# Patient Record
Sex: FEMALE | Race: WHITE | NOT HISPANIC OR LATINO | URBAN - METROPOLITAN AREA
[De-identification: names, ages, dates, MRNs, and addresses within clinical notes are randomized per-mention and may not be internally consistent; named-entity substitution may affect disease eponyms.]

---

## 2021-12-17 LAB
EXT 24 HR UR METANEPHRINE: NORMAL
EXT 24 HR UR NORMETANEPHRINE: NORMAL
EXT 24 HR UR NORMETANEPHRINE: NORMAL
EXT 25 HYDROXY VIT D2: NORMAL
EXT 25 HYDROXY VIT D3: NORMAL
EXT 5 HIAA 24 HR URINE: NORMAL
EXT 5 HIAA BLOOD: NORMAL
EXT ACTH: NORMAL
EXT AFP: NORMAL
EXT ALBUMIN: NORMAL
EXT ALKALINE PHOSPHATASE: NORMAL
EXT ALT: NORMAL
EXT AMYLASE: NORMAL
EXT ANTI ISLET CELL AB: NORMAL
EXT ANTI PARIETAL CELL AB: NORMAL
EXT ANTI THYROID AB: NORMAL
EXT AST: NORMAL
EXT BILIRUBIN DIRECT: NORMAL
EXT BILIRUBIN TOTAL: NORMAL
EXT BK VIRUS DNA QN PCR: NORMAL
EXT BUN: NORMAL
EXT C PEPTIDE: NORMAL
EXT CA 125: NORMAL
EXT CA 19-9: NORMAL
EXT CA 27-29: NORMAL
EXT CALCITONIN: NORMAL
EXT CALCIUM: 9.5 MG/DL
EXT CEA: NORMAL
EXT CHLORIDE: 99 MEQ/L
EXT CHOLESTEROL: NORMAL
EXT CHROMOGRANIN A: NORMAL
EXT CO2: 24 MEQ/L
EXT CREATININE UA: NORMAL
EXT CREATININE: 0.7 MG/DL
EXT CYCLOSPORONE LEVEL: NORMAL
EXT DOPAMINE: NORMAL
EXT EBV DNA BY PCR: NORMAL
EXT EPINEPHRINE: NORMAL
EXT FOLATE: NORMAL
EXT FREE T3: NORMAL
EXT FREE T4: NORMAL
EXT FSH: NORMAL
EXT GASTRIN RELEASING PEPTIDE: NORMAL
EXT GASTRIN RELEASING PEPTIDE: NORMAL
EXT GASTRIN: NORMAL
EXT GGT: NORMAL
EXT GHRELIN: NORMAL
EXT GLUCAGON: NORMAL
EXT GLUCOSE: 91 MG/DL
EXT GROWTH HORMONE: NORMAL
EXT HCV RNA QUANT PCR: NORMAL
EXT HDL: NORMAL
EXT HEMATOCRIT: 40.2 %
EXT HEMOGLOBIN A1C: NORMAL
EXT HEMOGLOBIN: 13.7 GRAM/DL
EXT HISTAMINE 24 HR URINE: NORMAL
EXT HISTAMINE: NORMAL
EXT IGF-1: NORMAL
EXT IMMUNKNOW (NON-STIMULATED): NORMAL
EXT IMMUNKNOW (STIMULATED): NORMAL
EXT INR: NORMAL
EXT INSULIN: NORMAL
EXT LANREOTIDE LEVEL: NORMAL
EXT LDH, TOTAL: NORMAL
EXT LDL CHOLESTEROL: NORMAL
EXT LIPASE: NORMAL
EXT MAGNESIUM: NORMAL
EXT METANEPHRINE FREE PLASMA: NORMAL
EXT MOTILIN: NORMAL
EXT NEUROKININ A CAMB: NORMAL
EXT NEUROKININ A ISI: NORMAL
EXT NEUROTENSIN: NORMAL
EXT NOREPINEPHRINE: NORMAL
EXT NORMETANEPHRINE: NORMAL
EXT NSE: NORMAL
EXT OCTREOTIDE LEVEL: NORMAL
EXT PANCREASTATIN CAMB: NORMAL
EXT PANCREASTATIN ISI: NORMAL
EXT PANCREATIC POLYPEPTIDE: NORMAL
EXT PHOSPHORUS: NORMAL
EXT PLATELETS: NORMAL
EXT POTASSIUM: 4.2 MEQ/L
EXT PROGRAF LEVEL: NORMAL
EXT PROLACTIN: NORMAL
EXT PROTEIN TOTAL: NORMAL
EXT PROTEIN UA: NORMAL
EXT PT: NORMAL
EXT PTH, INTACT: NORMAL
EXT PTT: NORMAL
EXT RAPAMUNE LEVEL: NORMAL
EXT SEROTONIN: NORMAL
EXT SODIUM: 134 MMOL/L
EXT SOMATOSTATIN: NORMAL
EXT SUBSTANCE P: NORMAL
EXT TRIGLYCERIDES: NORMAL
EXT TRYPTASE: NORMAL
EXT TSH: NORMAL
EXT URIC ACID: NORMAL
EXT URINE AMYLASE U/HR: NORMAL
EXT URINE AMYLASE U/L: NORMAL
EXT VASOACTIVE INTESTINAL POLYPEPTIDE: NORMAL
EXT VITAMIN B12: NORMAL
EXT VMA 24 HR URINE: NORMAL
EXT WBC: 10.2 K/UL
NEURON SPECIFIC ENOLASE: NORMAL

## 2022-01-11 ENCOUNTER — TELEPHONE (OUTPATIENT)
Dept: NEUROLOGY | Facility: HOSPITAL | Age: 82
End: 2022-01-11
Payer: COMMERCIAL

## 2022-01-11 NOTE — TELEPHONE ENCOUNTER
Spoke with pt. She reports that Dr. Romero saw her when she was in the hospital.  She is complaining of her leg being red and hot where it meets her abdomen on the right side.    A consult in her Media states she has a blood clot and possible hernia.  Instructed her to request and  her CT and MRI report and disc from  and bring to clinic.  Appointment made for an evaluation.  No further questions.

## 2022-01-11 NOTE — TELEPHONE ENCOUNTER
----- Message from Lizbeth Sotelo MA sent at 1/11/2022  4:02 PM CST -----  Type:  Needs Medical Advice    Who Called: pt  Regarding: pt states that she saw Dr. Romero in the Hospital and would like an appt with him.  Would the patient rather a call back or a response via [facility]? Call back  Best Call Back Number: [phone]  Additional Information: pt would like to be seen before next Tuesday 1/18/22

## 2022-01-12 RX ORDER — CYANOCOBALAMIN 1000 UG/ML
INJECTION, SOLUTION INTRAMUSCULAR; SUBCUTANEOUS
COMMUNITY
Start: 2022-01-04

## 2022-01-12 RX ORDER — SULFAMETHOXAZOLE AND TRIMETHOPRIM 800; 160 MG/1; MG/1
60 TABLET ORAL DAILY
COMMUNITY
Start: 2021-10-25

## 2022-01-12 RX ORDER — AMLODIPINE BESYLATE 5 MG/1
TABLET ORAL
COMMUNITY
Start: 2021-11-10

## 2022-01-12 NOTE — PROGRESS NOTES
"NOLANETS:  [facility]  A collaboration between [facility] and [facility]      PATIENT: Rakesh Shields  MRN: [id number]  DATE: 2022    Subjective:      Chief Complaint: Abdominal pain, right lower quadrant is warm & red. Saw in consult at [facility].    Still continues to have rt groin heat ( warmth) burning pain  All the time  No change in BM or voiding    Vitals: Blood pressure (!) 162/77, pulse 75, temperature 98.2 °F (36.8 °C), temperature source Oral, resp. rate 18, height 5' 4" (1.626 m), weight 56 kg (123 lb 5.6 oz).     ECOG Score: 1 - Symptomatic but completely ambulatory    Diagnosis: No diagnosis found.     Interval History:     Oncologic History: n/a    Past Medical History:  Past Medical History:   Diagnosis Date    Abdominal bloating     Arthritis     C. difficile colitis     Cervical stenosis of spine     Diarrhea     Diverticulosis     DM (diabetes mellitus)     GERD (gastroesophageal reflux disease)     HTN (hypertension)     Hyperlipidemia     Hypothyroidism     Incontinence, feces     Monoclonal gammopathy     Neuropathy     SOB (shortness of breath)     Urinary incontinence        Past Surgical History:  Past Surgical History:   Procedure Laterality Date    APPENDECTOMY      CATARACT EXTRACTION, BILATERAL       SECTION      x2    CHOLECYSTECTOMY      FOOT SURGERY      spur    HYSTERECTOMY      TOE AMPUTATION Left 2014    TONSILLECTOMY      TOTAL KNEE ARTHROPLASTY Right        Family History:  History reviewed. No pertinent family history.    Allergies:  [place], Eggs [egg derived], Gluten protein, Codeine, and Latex    Medications:   Current Outpatient Medications   Medication Sig    amLODIPine (NORVASC) 5 MG tablet     ARMOUR THYROID 60 mg Tab Take 60 mg by mouth once daily.    cyanocobalamin 1,000 mcg/mL injection Inject into the muscle.     No current " facility-administered medications for this visit.        Review of Systems   Constitutional: Negative for appetite change, chills, diaphoresis, fatigue, fever and unexpected weight change.   HENT: Negative for congestion, dental problem, facial swelling, hearing loss, nosebleeds, postnasal drip, rhinorrhea, sinus pressure, sneezing, sore throat, tinnitus, trouble swallowing and voice change.    Eyes: Negative for photophobia, discharge and visual disturbance.   Respiratory: Negative for cough, chest tightness, shortness of breath, wheezing and stridor.    Cardiovascular: Negative for chest pain, palpitations and leg swelling.   Gastrointestinal: Negative for abdominal pain, blood in stool, constipation, diarrhea and nausea.   Endocrine: Negative.    Genitourinary: Negative.    Musculoskeletal: Negative for back pain, gait problem and myalgias.   Skin: Negative for pallor, rash and wound.   Allergic/Immunologic: Negative.    Neurological: Negative.    Hematological: Does not bruise/bleed easily.   Psychiatric/Behavioral: Negative for confusion.      Objective:      Physical Exam  Constitutional:       Appearance: Normal appearance.      Comments: with walker   HENT:      Head: Normocephalic.      Right Ear: External ear normal.      Mouth/Throat:      Mouth: Mucous membranes are moist.   Eyes:      Pupils: Pupils are equal, round, and reactive to light.   Cardiovascular:      Rate and Rhythm: Regular rhythm.      Pulses: Normal pulses.   Pulmonary:      Effort: Pulmonary effort is normal.      Breath sounds: Normal breath sounds.   Abdominal:      General: Abdomen is flat. Bowel sounds are normal.      Tenderness: There is no abdominal tenderness. There is no right CVA tenderness, left CVA tenderness, guarding or rebound.      Hernia: No hernia is present.      Comments: No groin bulge or obvious hernia  Tender over rt groin   Musculoskeletal:      Cervical back: Normal range of motion. No rigidity or tenderness.    Skin:     General: Skin is warm.   Neurological:      General: No focal deficit present.      Mental Status: She is alert and oriented to person, place, and time.   Psychiatric:         Mood and Affect: Mood normal.        Assessment:       No diagnosis found.    Laboratory Data:   Neuroendocrine Labs Latest Ref Rng & Units 12/17/2021   EXT WBC K/UL 10.2   EXT HGB gram/dL 13.7   EXT HCT % 40.2   GLUCOSE 70 - 110 mg/dl    EXT GLUCOSE mg/dL 91   BUN 8 - 23 mg/dl    CREATININE 0.5 - 1.4 mg/dl    EXT CREATININE mg/dL 0.7    - 145 mMol/l    EXT NA mmol/L 134   K 3.5 - 5.1 mMol/l    EXT K mEq/L 4.2   CHLORIDE 95 - 110 mMol/l    EXT CHLORIDE mEq/L 99   CO2 23.0 - 29.0 mEq/L    EXT CO2 mEq/L 24   CALCIUM 8.7 - 10.5 mg/dl    EXT CALCIUM mg/dL 9.5       Scans:   MRI  Reviewed from [facility]      CT scan   Reviewed [facility]    Impression: persityent right groin warmth, tenderness ith no mass or hernia. Has not changed since hospital admission 2 weeks back at [facility]    Femoral hernia cannot be comletely ruled out although no sweklling    CT/MRI shows asymmeyeric rectus thickening    No abnormality over right soren no tenderness over upper abdomen    Plan:       Will get US groin  Will start gabapentin   F/u 2 weeks          LAUREN Holloway MD, FACS   Associate Professor of Surgery, [facility]   Neuroendocrine Surgery, Hepatic/Pancreatic & General Surgery   [address], [place], [address]   [place] [address]   ph. [phone]; [phone]   fax. [phone]

## 2022-01-13 ENCOUNTER — OFFICE VISIT (OUTPATIENT)
Dept: NEUROLOGY | Facility: HOSPITAL | Age: 82
End: 2022-01-13
Attending: SURGERY
Payer: COMMERCIAL

## 2022-01-13 VITALS
TEMPERATURE: 98 F | HEIGHT: 64 IN | BODY MASS INDEX: 21.06 KG/M2 | HEART RATE: 75 BPM | RESPIRATION RATE: 18 BRPM | DIASTOLIC BLOOD PRESSURE: 77 MMHG | SYSTOLIC BLOOD PRESSURE: 162 MMHG | WEIGHT: 123.38 LBS

## 2022-01-13 DIAGNOSIS — K40.90 RIGHT GROIN HERNIA: Primary | ICD-10-CM

## 2022-01-13 PROCEDURE — 99215 OFFICE O/P EST HI 40 MIN: CPT | Performed by: SURGERY

## 2022-01-13 RX ORDER — GABAPENTIN 300 MG/1
300 CAPSULE ORAL 2 TIMES DAILY
Qty: 60 CAPSULE | Refills: 1 | Status: SHIPPED | OUTPATIENT
Start: 2022-01-13 | End: 2023-01-13

## 2022-01-27 ENCOUNTER — HOSPITAL ENCOUNTER (OUTPATIENT)
Dept: RADIOLOGY | Facility: HOSPITAL | Age: 82
Discharge: HOME | End: 2022-01-27
Attending: SURGERY
Payer: COMMERCIAL

## 2022-01-27 DIAGNOSIS — K40.90 RIGHT GROIN HERNIA: ICD-10-CM

## 2022-01-27 PROCEDURE — 76882 US LMTD JT/FCL EVL NVASC XTR: CPT | Mod: TC,RT

## 2022-01-27 PROCEDURE — 76882 US LMTD JT/FCL EVL NVASC XTR: CPT | Mod: 26,RT,, | Performed by: RADIOLOGY

## 2022-01-27 PROCEDURE — 76882 US SOFT TISSUE, GROIN RIGHT: ICD-10-PCS | Mod: 26,RT,, | Performed by: RADIOLOGY

## 2022-02-09 ENCOUNTER — TELEPHONE (OUTPATIENT)
Dept: NEUROLOGY | Facility: HOSPITAL | Age: 82
End: 2022-02-09
Payer: COMMERCIAL

## 2022-02-09 NOTE — TELEPHONE ENCOUNTER
----- Message from Arlet Marcelo sent at 2/9/2022  2:34 PM CST -----  Contact: [phone]/self  Who Called: PT    Regarding:  pt would like to change appt to audio or cancel     Would the patient rather a call back or a response via [employer]? Call back    Best Call Back Number: [phone]    Additional Information: n/a

## 2022-02-09 NOTE — TELEPHONE ENCOUNTER
Returned pt call. She had her ultrasound.  She said the girl said she did not see anything but the doctor had to read it. She also states that she is not hurting anymore and would like to cancel her michelle with Dr. Romero.  Will call back if needed.